# Patient Record
Sex: FEMALE | Race: WHITE | Employment: PART TIME | URBAN - METROPOLITAN AREA
[De-identification: names, ages, dates, MRNs, and addresses within clinical notes are randomized per-mention and may not be internally consistent; named-entity substitution may affect disease eponyms.]

---

## 2017-08-10 ENCOUNTER — TELEPHONE (OUTPATIENT)
Dept: VASCULAR SURGERY | Age: 47
End: 2017-08-10

## 2017-08-10 NOTE — TELEPHONE ENCOUNTER
Received request for coumadin refill and notified the pharmacy that we have not seen the patient since 2013 and are not following her coumadin. We have not spoken with the patient as well since 2014 on the phone. Advised that we can not refill coumadin since it has been such a long gap in care.  They will notify the patient

## 2020-12-01 ENCOUNTER — TRANSCRIBE ORDER (OUTPATIENT)
Dept: SCHEDULING | Age: 50
End: 2020-12-01

## 2020-12-01 DIAGNOSIS — R51.9 HEADACHE: Primary | ICD-10-CM

## 2020-12-01 DIAGNOSIS — R42 DIZZINESS AND GIDDINESS: ICD-10-CM

## 2020-12-14 ENCOUNTER — HOSPITAL ENCOUNTER (OUTPATIENT)
Dept: MRI IMAGING | Age: 50
Discharge: HOME | End: 2020-12-14
Attending: INTERNAL MEDICINE
Payer: COMMERCIAL

## 2020-12-14 VITALS — BODY MASS INDEX: 42.51 KG/M2 | WEIGHT: 225 LBS

## 2020-12-14 DIAGNOSIS — R51.9 HEADACHE: ICD-10-CM

## 2020-12-14 DIAGNOSIS — R42 DIZZINESS AND GIDDINESS: ICD-10-CM

## 2020-12-14 PROCEDURE — 70553 MRI BRAIN STEM W/O & W/DYE: CPT

## 2020-12-14 PROCEDURE — A9575 INJ GADOTERATE MEGLUMI 0.1ML: HCPCS

## 2020-12-14 PROCEDURE — 74011636320 HC RX REV CODE- 636/320

## 2020-12-14 RX ADMIN — GADOTERATE MEGLUMINE 15 ML: 376.9 INJECTION INTRAVENOUS at 16:00
